# Patient Record
Sex: MALE | Race: ASIAN | NOT HISPANIC OR LATINO
[De-identification: names, ages, dates, MRNs, and addresses within clinical notes are randomized per-mention and may not be internally consistent; named-entity substitution may affect disease eponyms.]

---

## 2021-06-07 PROBLEM — Z00.00 ENCOUNTER FOR PREVENTIVE HEALTH EXAMINATION: Status: ACTIVE | Noted: 2021-06-07

## 2021-06-08 ENCOUNTER — APPOINTMENT (OUTPATIENT)
Dept: CARDIOLOGY | Facility: CLINIC | Age: 66
End: 2021-06-08
Payer: MEDICAID

## 2021-06-08 VITALS
SYSTOLIC BLOOD PRESSURE: 163 MMHG | TEMPERATURE: 97.3 F | RESPIRATION RATE: 17 BRPM | WEIGHT: 185 LBS | OXYGEN SATURATION: 97 % | DIASTOLIC BLOOD PRESSURE: 107 MMHG | BODY MASS INDEX: 29.03 KG/M2 | HEART RATE: 78 BPM | HEIGHT: 67 IN

## 2021-06-08 PROCEDURE — 93306 TTE W/DOPPLER COMPLETE: CPT

## 2021-06-08 PROCEDURE — 99204 OFFICE O/P NEW MOD 45 MIN: CPT | Mod: 25

## 2021-06-08 PROCEDURE — 93015 CV STRESS TEST SUPVJ I&R: CPT

## 2021-06-16 ENCOUNTER — NON-APPOINTMENT (OUTPATIENT)
Age: 66
End: 2021-06-16

## 2021-12-21 ENCOUNTER — APPOINTMENT (OUTPATIENT)
Dept: CARDIOLOGY | Facility: CLINIC | Age: 66
End: 2021-12-21
Payer: MEDICAID

## 2021-12-21 VITALS
OXYGEN SATURATION: 96 % | RESPIRATION RATE: 18 BRPM | SYSTOLIC BLOOD PRESSURE: 145 MMHG | BODY MASS INDEX: 28.98 KG/M2 | WEIGHT: 185 LBS | TEMPERATURE: 98 F | DIASTOLIC BLOOD PRESSURE: 77 MMHG | HEART RATE: 82 BPM

## 2021-12-21 PROCEDURE — 99214 OFFICE O/P EST MOD 30 MIN: CPT

## 2021-12-21 NOTE — HISTORY OF PRESENT ILLNESS
[FreeTextEntry1] : 66 year-old male with HTN, DM, and HLD presents for evaluation of CP and palpitations.  Patient reports that for the past few months he has been experiencing left upper and lower CP, described as tightness and also palpitations, not related to exertion, lasting hours.  Patient denies SOB.  Patient denies h/o syncope.  I advised patient to undergo an echocardiogram and a treadmill stress test.  I advised patient to wear a Holter monitor.  Patient underwent an echocardiogram and it showed normal LV function without significant valvular pathology. Patient underwent a treadmill stress test and completed 6 minutes of Stephen protocol.  There were upsloping ST depressions on ECG but no symptoms.  Following treadmill stress, there was echocardiographic evidence of basal inferior ischemia.  Patient wore a Holter and it showed average HR 63, rare PVCs, 9.44% PACs and couplets. 3 runs of PATs, the longest and fastest 19 beats at a rate of 137.  I advised patient to continue ASA 81 mg and Atorvastatin 40 mg.  I advised patient to start Metoprolol ER 25 mg.

## 2021-12-26 RX ORDER — ATORVASTATIN CALCIUM 40 MG/1
40 TABLET, FILM COATED ORAL
Qty: 30 | Refills: 0 | Status: ACTIVE | COMMUNITY
Start: 2021-12-14

## 2021-12-26 RX ORDER — METFORMIN HYDROCHLORIDE 1000 MG/1
1000 TABLET, COATED ORAL
Qty: 60 | Refills: 0 | Status: ACTIVE | COMMUNITY
Start: 2021-12-14

## 2021-12-26 RX ORDER — ERGOCALCIFEROL 1.25 MG/1
1.25 MG CAPSULE, LIQUID FILLED ORAL
Qty: 4 | Refills: 0 | Status: ACTIVE | COMMUNITY
Start: 2021-12-14

## 2021-12-26 RX ORDER — ALOGLIPTIN 25 MG/1
25 TABLET, FILM COATED ORAL
Qty: 30 | Refills: 0 | Status: ACTIVE | COMMUNITY
Start: 2021-12-14

## 2021-12-26 RX ORDER — ERTUGLIFLOZIN 15 MG/1
15 TABLET, FILM COATED ORAL
Qty: 30 | Refills: 0 | Status: ACTIVE | COMMUNITY
Start: 2021-12-14

## 2021-12-26 RX ORDER — CHLORHEXIDINE GLUCONATE 4 %
1000 LIQUID (ML) TOPICAL
Qty: 30 | Refills: 0 | Status: ACTIVE | COMMUNITY
Start: 2021-12-14

## 2021-12-26 NOTE — REASON FOR VISIT
[Symptom and Test Evaluation] : symptom and test evaluation [FreeTextEntry1] : 66 year-old male with HTN, DM, and HLD presents for followup.  \par \par Patient was last seen on 6/8/21 for evaluation of CP and palpitations.  Patient underwent an echocardiogram and it showed normal LV function without significant valvular pathology. Patient underwent a treadmill stress test and completed 5.5 minutes of Stephen protocol.  There were upsloping ST depressions on ECG but no symptoms.  Following treadmill stress, there was echocardiographic evidence of basal inferior ischemia.  Patient wore a Holter and it showed average HR 63, rare PVCs, 9.44% PACs and couplets. 3 runs of PATs, the longest and fastest 19 beats at a rate of 137.  I advised patient to continue ASA 81 mg and Atorvastatin 40 mg.  I advised patient to start Metoprolol ER 25 mg.

## 2021-12-26 NOTE — HISTORY OF PRESENT ILLNESS
[FreeTextEntry1] : 12/21/21 - \par \par 6/8/21 - Patient reports that for the past few months he has been experiencing left upper and lower CP, described as tightness and also palpitations, not related to exertion, lasting hours.  Patient denies SOB.  Patient denies h/o syncope.  I advised patient to undergo an echocardiogram and a treadmill stress test.  I advised patient to wear a Holter monitor.  Patient underwent an echocardiogram and it showed normal LV function without significant valvular pathology. Patient underwent a treadmill stress test and completed 6 minutes of Stephen protocol.  There were upsloping ST depressions on ECG but no symptoms.  Following treadmill stress, there was echocardiographic evidence of basal inferior ischemia.  Patient wore a Holter and it showed average HR 63, rare PVCs, 9.44% PACs and couplets. 3 runs of PATs, the longest and fastest 19 beats at a rate of 137.  I advised patient to continue ASA 81 mg and Atorvastatin 40 mg.  I advised patient to start Metoprolol ER 25 mg.

## 2023-12-19 ENCOUNTER — APPOINTMENT (OUTPATIENT)
Dept: CARDIOLOGY | Facility: CLINIC | Age: 68
End: 2023-12-19
Payer: MEDICARE

## 2023-12-19 VITALS
TEMPERATURE: 97.6 F | BODY MASS INDEX: 27.88 KG/M2 | WEIGHT: 178 LBS | OXYGEN SATURATION: 97 % | SYSTOLIC BLOOD PRESSURE: 192 MMHG | DIASTOLIC BLOOD PRESSURE: 83 MMHG | RESPIRATION RATE: 16 BRPM | HEART RATE: 81 BPM

## 2023-12-19 DIAGNOSIS — R94.39 ABNORMAL RESULT OF OTHER CARDIOVASCULAR FUNCTION STUDY: ICD-10-CM

## 2023-12-19 PROCEDURE — 93000 ELECTROCARDIOGRAM COMPLETE: CPT

## 2023-12-19 PROCEDURE — 99214 OFFICE O/P EST MOD 30 MIN: CPT | Mod: 25

## 2023-12-19 PROCEDURE — 93306 TTE W/DOPPLER COMPLETE: CPT

## 2023-12-20 PROBLEM — R94.39 ABNORMAL CARDIOVASCULAR STRESS TEST: Status: ACTIVE | Noted: 2021-12-26

## 2023-12-20 LAB
ALBUMIN SERPL ELPH-MCNC: 4.6 G/DL
ALP BLD-CCNC: 84 U/L
ALT SERPL-CCNC: 14 U/L
ANION GAP SERPL CALC-SCNC: 16 MMOL/L
AST SERPL-CCNC: 16 U/L
BILIRUB SERPL-MCNC: 0.9 MG/DL
BUN SERPL-MCNC: 14 MG/DL
CALCIUM SERPL-MCNC: 9 MG/DL
CHLORIDE SERPL-SCNC: 105 MMOL/L
CO2 SERPL-SCNC: 25 MMOL/L
CREAT SERPL-MCNC: 0.51 MG/DL
EGFR: 112 ML/MIN/1.73M2
GLUCOSE SERPL-MCNC: 145 MG/DL
NT-PROBNP SERPL-MCNC: 171 PG/ML
POTASSIUM SERPL-SCNC: 3.3 MMOL/L
PROT SERPL-MCNC: 6.6 G/DL
SODIUM SERPL-SCNC: 145 MMOL/L
TROPONIN I SERPL-MCNC: 0.01 NG/ML

## 2023-12-20 NOTE — REASON FOR VISIT
[FreeTextEntry1] : 66 year-old male with HTN, DM, and HLD presents for followup.    Patient was last seen on 12/21/21 - Patient reports left upper CP, worse with exertion, non-tender to touch, lasting 1 hour.  Patient reports mild SOB.  I advised patient to resume ASA 81 mg and start NTPatch.  FU 3 months.   Patient underwent an echocardiogram 6/8/21 and it showed normal LV function without significant valvular pathology.   Patient underwent a treadmill stress test 6/8/21 and completed 5.5 minutes of Stephen protocol.  There were upsloping ST depressions on ECG but no symptoms.  Following treadmill stress, there was echocardiographic evidence of basal inferior ischemia.    Patient wore a Holter and it showed average HR 63, rare PVCs, 9.44% PACs and couplets. 3 runs of PATs, the longest and fastest 19 beats at a rate of 137.  I advised patient to continue ASA 81 mg and Atorvastatin 40 mg.  I advised patient to start Metoprolol ER 25 mg.

## 2023-12-20 NOTE — HISTORY OF PRESENT ILLNESS
[FreeTextEntry1] : 12/19/23 - Patient reports SSCP and palpitations for 2 weeks, not related to exertion.  Patient reports baseline SHEPPARD.  Patient was noted to have an abnormal ECG by PCP, showing RBBB with nonspecific STTw changes.  /83 HR 81.  I advised patient to start Metoprolol ER 25 mg QD.  I advised patient to start Amlodipine 5 mg QD.  FU 2 weeks.  I will check Troponin level.  I will consider treadmill stress test when BP is better vs. CTA of the coronary arteries.  12/21/21 - Patient reports left upper CP, worse with exertion, non-tender to touch, lasting 1 hour.  Patient reports mild SOB.  I advised patient to resume ASA 81 mg and start NTPatch.  FU 3 months.   6/8/21 - Patient reports that for the past few months he has been experiencing left upper and lower CP, described as tightness and also palpitations, not related to exertion, lasting hours.  Patient denies SOB.  Patient denies h/o syncope.  I advised patient to undergo an echocardiogram and a treadmill stress test.  I advised patient to wear a Holter monitor.  Patient underwent an echocardiogram and it showed normal LV function without significant valvular pathology. Patient underwent a treadmill stress test and completed 6 minutes of Stephen protocol.  There were upsloping ST depressions on ECG but no symptoms.  Following treadmill stress, there was echocardiographic evidence of basal inferior ischemia.  Patient wore a Holter and it showed average HR 63, rare PVCs, 9.44% PACs and couplets. 3 runs of PATs, the longest and fastest 19 beats at a rate of 137.  I advised patient to continue ASA 81 mg and Atorvastatin 40 mg.  I advised patient to start Metoprolol ER 25 mg.

## 2023-12-30 PROBLEM — R07.89 CHEST DISCOMFORT: Status: ACTIVE | Noted: 2021-06-08

## 2023-12-30 NOTE — PHYSICAL EXAM
[Well Developed] : well developed [Well Nourished] : well nourished [No Acute Distress] : no acute distress [Normal Conjunctiva] : normal conjunctiva [Normal Venous Pressure] : normal venous pressure [No Carotid Bruit] : no carotid bruit [Normal S1, S2] : normal S1, S2 [No Murmur] : no murmur [No Rub] : no rub [No Gallop] : no gallop [Clear Lung Fields] : clear lung fields [Good Air Entry] : good air entry [No Respiratory Distress] : no respiratory distress  [Soft] : abdomen soft [Non Tender] : non-tender [No Masses/organomegaly] : no masses/organomegaly [Normal Gait] : normal gait [Normal Bowel Sounds] : normal bowel sounds [No Edema] : no edema [No Cyanosis] : no cyanosis [No Clubbing] : no clubbing [No Varicosities] : no varicosities [No Focal Deficits] : no focal deficits [Moves all extremities] : moves all extremities [Normal Speech] : normal speech [Alert and Oriented] : alert and oriented [Normal memory] : normal memory

## 2024-01-02 ENCOUNTER — APPOINTMENT (OUTPATIENT)
Dept: CARDIOLOGY | Facility: CLINIC | Age: 69
End: 2024-01-02
Payer: MEDICARE

## 2024-01-02 VITALS
BODY MASS INDEX: 26.94 KG/M2 | WEIGHT: 172 LBS | OXYGEN SATURATION: 98 % | HEART RATE: 91 BPM | RESPIRATION RATE: 16 BRPM | SYSTOLIC BLOOD PRESSURE: 186 MMHG | TEMPERATURE: 97.7 F | DIASTOLIC BLOOD PRESSURE: 80 MMHG

## 2024-01-02 DIAGNOSIS — R07.89 OTHER CHEST PAIN: ICD-10-CM

## 2024-01-02 DIAGNOSIS — R00.2 PALPITATIONS: ICD-10-CM

## 2024-01-02 PROCEDURE — 99213 OFFICE O/P EST LOW 20 MIN: CPT

## 2024-01-02 RX ORDER — METOPROLOL SUCCINATE 50 MG/1
50 TABLET, EXTENDED RELEASE ORAL DAILY
Qty: 30 | Refills: 5 | Status: ACTIVE | COMMUNITY
Start: 2021-06-08 | End: 1900-01-01

## 2024-01-02 RX ORDER — AMLODIPINE BESYLATE 5 MG/1
5 TABLET ORAL DAILY
Qty: 30 | Refills: 5 | Status: ACTIVE | COMMUNITY
Start: 2021-12-14 | End: 1900-01-01

## 2024-01-02 NOTE — HISTORY OF PRESENT ILLNESS
[FreeTextEntry1] : 1/2/24 - Patient reports home SBP around 160.  Patient reports CP.  Patient denies SOB.  Patient reports palpitations.  His BP was elevated.  I advised patient to increase Metoprolol ER to 50 mg.  I advised patient to increase Amlodipine to 5 mg.  I advised patient to wear a 7-day event monitor.  I advised patient to undergo a CTA of coronary arteries.  I will obtain insurance authorization.   12/19/23 - Patient reports SSCP and palpitations for 2 weeks, not related to exertion.  Patient reports baseline SHEPPARD.  Patient was noted to have an abnormal ECG by PCP, showing RBBB with nonspecific STTw changes.  /83 HR 81.  I advised patient to start Metoprolol ER 25 mg QD.  I advised patient to start Amlodipine 2.5 mg QD.  FU 2 weeks.  I will check Troponin level.  I will consider treadmill stress test when BP is better vs. CTA of the coronary arteries.  12/21/21 - Patient reports left upper CP, worse with exertion, non-tender to touch, lasting 1 hour.  Patient reports mild SOB.  I advised patient to resume ASA 81 mg and start NTPatch.  FU 3 months.   6/8/21 - Patient reports that for the past few months he has been experiencing left upper and lower CP, described as tightness and also palpitations, not related to exertion, lasting hours.  Patient denies SOB.  Patient denies h/o syncope.  I advised patient to undergo an echocardiogram and a treadmill stress test.  I advised patient to wear a Holter monitor.  Patient underwent an echocardiogram and it showed normal LV function without significant valvular pathology. Patient underwent a treadmill stress test and completed 6 minutes of Stephen protocol.  There were upsloping ST depressions on ECG but no symptoms.  Following treadmill stress, there was echocardiographic evidence of basal inferior ischemia.  Patient wore a Holter and it showed average HR 63, rare PVCs, 9.44% PACs and couplets. 3 runs of PATs, the longest and fastest 19 beats at a rate of 137.  I advised patient to continue ASA 81 mg and Atorvastatin 40 mg.  I advised patient to start Metoprolol ER 25 mg.

## 2024-01-02 NOTE — REASON FOR VISIT
[FreeTextEntry1] : 68 year-old male with HTN, DM, HLD, presents for followup.    Patient was last seen on 12/19/23 - Patient reports SSCP and palpitations for 2 weeks, not related to exertion.  Patient reports baseline SHEPPARD.  Patient was noted to have an abnormal ECG by PCP, showing RBBB with nonspecific STTw changes.  /83 HR 81.  I advised patient to start Metoprolol ER 25 mg QD.  I advised patient to start Amlodipine 2.5 mg QD.  FU 2 weeks.  I will check Troponin level.  I will consider treadmill stress test when BP is better vs. CTA of the coronary arteries.  He is on Metoprolol ER 25 mg, Amlodipine 2.5 mg, Valsartan 160 mg, for HTN.  He is on Atorvastatin 40 mg for HLD.  He is on Doxazosin 1 mg for BPH.  Patient underwent an echocardiogram 6/8/21 and it showed normal LV function without significant valvular pathology.   Patient underwent a treadmill stress test 6/8/21 and completed 5.5 minutes of Stephen protocol.  There were upsloping ST depressions on ECG but no symptoms.  Following treadmill stress, there was echocardiographic evidence of basal inferior ischemia.    Patient wore a Holter and it showed average HR 63, rare PVCs, 9.44% PACs and couplets. 3 runs of PATs, the longest and fastest 19 beats at a rate of 137.  I advised patient to continue ASA 81 mg and Atorvastatin 40 mg.  I advised patient to start Metoprolol ER 25 mg.

## 2024-01-15 ENCOUNTER — NON-APPOINTMENT (OUTPATIENT)
Age: 69
End: 2024-01-15

## 2024-01-16 ENCOUNTER — APPOINTMENT (OUTPATIENT)
Dept: UROLOGY | Facility: CLINIC | Age: 69
End: 2024-01-16
Payer: MEDICARE

## 2024-01-16 VITALS
RESPIRATION RATE: 16 BRPM | HEIGHT: 73 IN | HEART RATE: 85 BPM | DIASTOLIC BLOOD PRESSURE: 79 MMHG | SYSTOLIC BLOOD PRESSURE: 204 MMHG | OXYGEN SATURATION: 96 % | TEMPERATURE: 97.1 F | BODY MASS INDEX: 23.19 KG/M2 | WEIGHT: 175 LBS

## 2024-01-16 VITALS — DIASTOLIC BLOOD PRESSURE: 102 MMHG | SYSTOLIC BLOOD PRESSURE: 185 MMHG

## 2024-01-16 DIAGNOSIS — N13.8 BENIGN PROSTATIC HYPERPLASIA WITH LOWER URINARY TRACT SYMPMS: ICD-10-CM

## 2024-01-16 DIAGNOSIS — F17.210 NICOTINE DEPENDENCE, CIGARETTES, UNCOMPLICATED: ICD-10-CM

## 2024-01-16 DIAGNOSIS — E11.628 TYPE 2 DIABETES MELLITUS WITH OTHER SKIN COMPLICATIONS: ICD-10-CM

## 2024-01-16 DIAGNOSIS — R35.0 FREQUENCY OF MICTURITION: ICD-10-CM

## 2024-01-16 DIAGNOSIS — R35.1 NOCTURIA: ICD-10-CM

## 2024-01-16 DIAGNOSIS — Z78.9 OTHER SPECIFIED HEALTH STATUS: ICD-10-CM

## 2024-01-16 DIAGNOSIS — N40.1 BENIGN PROSTATIC HYPERPLASIA WITH LOWER URINARY TRACT SYMPMS: ICD-10-CM

## 2024-01-16 PROCEDURE — G2211 COMPLEX E/M VISIT ADD ON: CPT

## 2024-01-16 PROCEDURE — 99203 OFFICE O/P NEW LOW 30 MIN: CPT

## 2024-01-16 RX ORDER — TAMSULOSIN HYDROCHLORIDE 0.4 MG/1
0.4 CAPSULE ORAL
Qty: 30 | Refills: 3 | Status: ACTIVE | COMMUNITY
Start: 2024-01-16 | End: 1900-01-01

## 2024-01-17 LAB
ANION GAP SERPL CALC-SCNC: 12 MMOL/L
APPEARANCE: CLEAR
BACTERIA: NEGATIVE /HPF
BILIRUBIN URINE: NEGATIVE
BLOOD URINE: NEGATIVE
BUN SERPL-MCNC: 14 MG/DL
CALCIUM SERPL-MCNC: 9.3 MG/DL
CAST: 0 /LPF
CHLORIDE SERPL-SCNC: 106 MMOL/L
CO2 SERPL-SCNC: 24 MMOL/L
COLOR: YELLOW
CREAT SERPL-MCNC: 0.62 MG/DL
EGFR: 105 ML/MIN/1.73M2
EPITHELIAL CELLS: 0 /HPF
ESTIMATED AVERAGE GLUCOSE: 146 MG/DL
GLUCOSE QUALITATIVE U: >=1000 MG/DL
GLUCOSE SERPL-MCNC: 199 MG/DL
HBA1C MFR BLD HPLC: 6.7 %
KETONES URINE: NEGATIVE MG/DL
LEUKOCYTE ESTERASE URINE: NEGATIVE
MICROSCOPIC-UA: NORMAL
NITRITE URINE: NEGATIVE
PH URINE: 7.5
POTASSIUM SERPL-SCNC: 3.9 MMOL/L
PROTEIN URINE: NEGATIVE MG/DL
PSA FREE FLD-MCNC: 28 %
PSA FREE SERPL-MCNC: 0.16 NG/ML
PSA SERPL-MCNC: 0.57 NG/ML
RED BLOOD CELLS URINE: 0 /HPF
SODIUM SERPL-SCNC: 142 MMOL/L
SPECIFIC GRAVITY URINE: 1.03
UROBILINOGEN URINE: 1 MG/DL
WHITE BLOOD CELLS URINE: 0 /HPF

## 2024-01-20 NOTE — LETTER BODY
[FreeTextEntry1] : Chelo Mcdonald MD [id number],  [place], [place], [address] [phone]  Dear Dr. Mcdonald,  Reason for Visit: BPH.  This is a 68 year old gentleman with symptoms of BPH. Patient is here today for evaluation. Patient reports he has weak uroflow, frequency, and hesitancy. He denies any hematuria or urinary incontinence. His symptoms are aggravated by hydration. He denies any alleviating factors. He has not tried any medical therapy previously. He reports no pain. Patient is diabetic. All other review of systems are negative. Past medical history, family history, and social history were inquired and were noncontributory to patient current condition. Medications and allergies were reviewed.  On examination, the patient is a healthy-appearing gentleman in no acute distress. He is alert and oriented follows commands. He has normal mood and affect. He is normocephalic. Neck is supple. Oral no thrush Respirations are unlabored. His abdomen is soft and nontender. Bladder is nonpalpable. No CVA tenderness. Neurologically he is grossly intact. No peripheral edema. Skin without gross abnormality. He has normal male external genitalia. Normal meatus. Bilateral testes are descended intrascrotally and normal to palpation. On rectal examination, there is normal sphincter tone. The prostate is clinically benign without focal induration or nodularity.   ASSESSMENT: BPH  I counseled the patient on the various etiology of his symptoms. I discussed the natural history of BPH and the treatment options available. I discussed the options of conservative management with fluid in dietary restrictions, herbal therapy, medical therapy, and minimally invasive procedures.  Risk and benefits were discussed. I answered his questions. I recommended he try Flomax. I discussed the potential side effects of the medication. I counseled the patient on its use and side effects. If the patient develops any side effects, the patient will discontinue the medication and contact me. He will obtain urinalysis, A1C, PSA and BMP to establish baseline. Risks and alternatives were discussed. I answered the patient questions. The patient will follow-up as directed and will contact me with any questions or concerns.   Plan: Trial of Flomax. Followup 1 month. PSA. BMP. A1C. Urinalysis.

## 2024-01-20 NOTE — ADDENDUM
[FreeTextEntry1] : Entered by Maddy Raymond acting as scribe for Dr. Reed Muller.  The documentation recorded by the scribe accurately reflects the service I personally performed and the decisions made by me.

## 2024-02-09 ENCOUNTER — APPOINTMENT (OUTPATIENT)
Dept: CARDIOLOGY | Facility: CLINIC | Age: 69
End: 2024-02-09
Payer: MEDICARE

## 2024-02-09 VITALS
RESPIRATION RATE: 18 BRPM | HEART RATE: 73 BPM | BODY MASS INDEX: 23.09 KG/M2 | WEIGHT: 175 LBS | SYSTOLIC BLOOD PRESSURE: 156 MMHG | TEMPERATURE: 97.3 F | DIASTOLIC BLOOD PRESSURE: 71 MMHG | OXYGEN SATURATION: 98 %

## 2024-02-09 PROCEDURE — 99213 OFFICE O/P EST LOW 20 MIN: CPT

## 2024-02-15 RX ORDER — ASPIRIN ENTERIC COATED TABLETS 81 MG 81 MG/1
81 TABLET, DELAYED RELEASE ORAL DAILY
Qty: 30 | Refills: 5 | Status: DISCONTINUED | COMMUNITY
Start: 2021-12-21 | End: 2024-02-15

## 2024-02-15 RX ORDER — NITROGLYCERIN 20 MG/1
0.1 PATCH TRANSDERMAL DAILY
Qty: 30 | Refills: 5 | Status: ACTIVE | COMMUNITY
Start: 2021-12-21 | End: 1900-01-01

## 2024-02-15 NOTE — REASON FOR VISIT
[FreeTextEntry1] : 68 year-old male with CAD by CTA, elevated calcium score 1902, HTN, DM, HLD, presents for followup.    Patient was last seen on 1/2/24 - Patient reports home SBP around 160.  Patient reports CP.  Patient denies SOB.  Patient reports palpitations.  His BP was elevated.  I advised patient to increase Metoprolol ER to 50 mg.  I advised patient to increase Amlodipine to 5 mg.  I advised patient to wear a 7-day event monitor.  I advised patient to undergo a CTA of coronary arteries.  I will obtain insurance authorization.  Patient wore a 7-day event monitor and it showed average HR of 71, frequent APC's (30%), rare PVC's, without significant arrhythmia. No AFIB.  CTA of the coronary arteries showed 25-40% LM, 70% LAD, 50-69% OM1, 70% OM1, 99% RCA, with calcium score 1902. EF 33%.   He is on Metoprolol ER 25 mg, Amlodipine 2.5 mg, Valsartan 160 mg, for HTN.  He is on Atorvastatin 40 mg for HLD.  He is on Doxazosin 1 mg for BPH.  Patient underwent an echocardiogram 6/8/21 and it showed normal LV function without significant valvular pathology.   Patient underwent a treadmill stress test 6/8/21 and completed 5.5 minutes of Stephen protocol.  There were upsloping ST depressions on ECG but no symptoms.  Following treadmill stress, there was echocardiographic evidence of basal inferior ischemia.    Patient wore a Holter and it showed average HR 63, rare PVCs, 9.44% PACs and couplets. 3 runs of PATs, the longest and fastest 19 beats at a rate of 137.  I advised patient to continue ASA 81 mg and Atorvastatin 40 mg.  I advised patient to start Metoprolol ER 25 mg.

## 2024-02-15 NOTE — HISTORY OF PRESENT ILLNESS
[FreeTextEntry1] :  2/9/24 - CTA of the coronary arteries showed 25-40% LM, 70% LAD, 50-69% OM1, 70% OM1, 99% RCA, with calcium score 1902. EF 33%.  I advised patient to continue statin therapy.  I advised patient to resume ASA 81 mg.  I advised patient to start NTPatch.  I advised patient to consider cardiac cath.  FU 1 month.   1/2/24 - Patient reports home SBP around 160.  Patient reports CP.  Patient denies SOB.  Patient reports palpitations.  His BP was elevated.  I advised patient to increase Metoprolol ER to 50 mg.  I advised patient to increase Amlodipine to 5 mg.  I advised patient to wear a 7-day event monitor.  I advised patient to undergo a CTA of coronary arteries.  I will obtain insurance authorization.  Patient wore a 7-day event monitor and it showed average HR of 71, frequent APC's (30%), rare PVC's, without significant arrhythmia. No AFIB.  CTA of the coronary arteries showed 25-40% LM, 70% LAD, 50-69% OM1, 70% OM1, 99% RCA, with calcium score 1902. EF 33%.   12/19/23 - Patient reports SSCP and palpitations for 2 weeks, not related to exertion.  Patient reports baseline SHEPPARD.  Patient was noted to have an abnormal ECG by PCP, showing RBBB with nonspecific STTw changes.  /83 HR 81.  I advised patient to start Metoprolol ER 25 mg QD.  I advised patient to start Amlodipine 2.5 mg QD.  FU 2 weeks.  I will check Troponin level.  I will consider treadmill stress test when BP is better vs. CTA of the coronary arteries.  12/21/21 - Patient reports left upper CP, worse with exertion, non-tender to touch, lasting 1 hour.  Patient reports mild SOB.  I advised patient to resume ASA 81 mg and start NTPatch.  FU 3 months.   6/8/21 - Patient reports that for the past few months he has been experiencing left upper and lower CP, described as tightness and also palpitations, not related to exertion, lasting hours.  Patient denies SOB.  Patient denies h/o syncope.  I advised patient to undergo an echocardiogram and a treadmill stress test.  I advised patient to wear a Holter monitor.  Patient underwent an echocardiogram and it showed normal LV function without significant valvular pathology. Patient underwent a treadmill stress test and completed 6 minutes of Stephen protocol.  There were upsloping ST depressions on ECG but no symptoms.  Following treadmill stress, there was echocardiographic evidence of basal inferior ischemia.  Patient wore a Holter and it showed average HR 63, rare PVCs, 9.44% PACs and couplets. 3 runs of PATs, the longest and fastest 19 beats at a rate of 137.  I advised patient to continue ASA 81 mg and Atorvastatin 40 mg.  I advised patient to start Metoprolol ER 25 mg.

## 2024-02-20 ENCOUNTER — APPOINTMENT (OUTPATIENT)
Dept: UROLOGY | Facility: CLINIC | Age: 69
End: 2024-02-20

## 2024-03-10 PROBLEM — R93.1 ABNORMAL CARDIAC CT ANGIOGRAPHY: Status: ACTIVE | Noted: 2024-02-09

## 2024-03-10 NOTE — PHYSICAL EXAM
[Well Developed] : well developed [Well Nourished] : well nourished [No Acute Distress] : no acute distress [Normal Conjunctiva] : normal conjunctiva [No Carotid Bruit] : no carotid bruit [Normal Venous Pressure] : normal venous pressure [Normal S1, S2] : normal S1, S2 [No Murmur] : no murmur [No Rub] : no rub [No Gallop] : no gallop [Clear Lung Fields] : clear lung fields [Good Air Entry] : good air entry [No Respiratory Distress] : no respiratory distress  [Soft] : abdomen soft [No Masses/organomegaly] : no masses/organomegaly [Non Tender] : non-tender [Normal Gait] : normal gait [Normal Bowel Sounds] : normal bowel sounds [No Edema] : no edema [No Cyanosis] : no cyanosis [No Clubbing] : no clubbing [No Varicosities] : no varicosities [Moves all extremities] : moves all extremities [No Focal Deficits] : no focal deficits [Normal Speech] : normal speech [Alert and Oriented] : alert and oriented [Normal memory] : normal memory

## 2024-03-12 ENCOUNTER — APPOINTMENT (OUTPATIENT)
Dept: CARDIOLOGY | Facility: CLINIC | Age: 69
End: 2024-03-12
Payer: MEDICARE

## 2024-03-12 VITALS
DIASTOLIC BLOOD PRESSURE: 81 MMHG | RESPIRATION RATE: 18 BRPM | HEART RATE: 71 BPM | OXYGEN SATURATION: 97 % | TEMPERATURE: 97.1 F | SYSTOLIC BLOOD PRESSURE: 183 MMHG | BODY MASS INDEX: 22.96 KG/M2 | WEIGHT: 174 LBS

## 2024-03-12 DIAGNOSIS — R93.1 ABNORMAL FINDINGS ON DIAGNOSTIC IMAGING OF HEART AND CORONARY CIRCULATION: ICD-10-CM

## 2024-03-12 PROCEDURE — 99213 OFFICE O/P EST LOW 20 MIN: CPT

## 2024-03-12 RX ORDER — NICOTINE POLACRILEX 2 MG/1
2 GUM, CHEWING ORAL
Qty: 1 | Refills: 5 | Status: ACTIVE | COMMUNITY
Start: 2024-03-12 | End: 1900-01-01

## 2024-03-12 RX ORDER — VALSARTAN 320 MG/1
320 TABLET, COATED ORAL
Qty: 30 | Refills: 5 | Status: ACTIVE | COMMUNITY
Start: 2021-12-14 | End: 1900-01-01

## 2024-03-16 NOTE — HISTORY OF PRESENT ILLNESS
[FreeTextEntry1] : 3/12/24 - Pt reports having flu for 2 weeks and his SBP increased to 180 for 2 days. Pt reports CP and palpitation are improved. Pt also reports positional dizziness occasionally for 2 weeks. Patient denies SOB. Patient denies h/o syncope. Pt denies any bleeding issue while taking ASA. Pt admits smoking 1/3 PPD and drinking coffee 1 cup/day. Today's /73 P 71, repeated /90P 71. Patient has trace edema on L   Pt is on Metoprolol ER 25 mg, Amlodipine 2.5 mg, Valsartan 160 mg, ASA 81 mg, and NTPatch QD. He is on Atorvastatin 40 mg for HLD. He is on Doxazosin 1 mg for BPH.  2/9/24 - CTA of the coronary arteries showed 25-40% LM, 70% LAD, 50-69% OM1, 70% OM1, 99% RCA, with calcium score 1902. EF 33%.  I advised patient to continue statin therapy.  I advised patient to resume ASA 81 mg.  I advised patient to start NTPatch.  I advised patient to consider cardiac cath.  FU 1 month.   1/2/24 - Patient reports home SBP around 160.  Patient reports CP.  Patient denies SOB.  Patient reports palpitations.  His BP was elevated.  I advised patient to increase Metoprolol ER to 50 mg.  I advised patient to increase Amlodipine to 5 mg.  I advised patient to wear a 7-day event monitor.  I advised patient to undergo a CTA of coronary arteries.  I will obtain insurance authorization.  Patient wore a 7-day event monitor and it showed average HR of 71, frequent APC's (30%), rare PVC's, without significant arrhythmia. No AFIB.  CTA of the coronary arteries showed 25-40% LM, 70% LAD, 50-69% OM1, 70% OM1, 99% RCA, with calcium score 1902. EF 33%.   12/19/23 - Patient reports SSCP and palpitations for 2 weeks, not related to exertion.  Patient reports baseline SHEPPARD.  Patient was noted to have an abnormal ECG by PCP, showing RBBB with nonspecific STTw changes.  /83 HR 81.  I advised patient to start Metoprolol ER 25 mg QD.  I advised patient to start Amlodipine 2.5 mg QD.  FU 2 weeks.  I will check Troponin level.  I will consider treadmill stress test when BP is better vs. CTA of the coronary arteries.  12/21/21 - Patient reports left upper CP, worse with exertion, non-tender to touch, lasting 1 hour.  Patient reports mild SOB.  I advised patient to resume ASA 81 mg and start NTPatch.  FU 3 months.   6/8/21 - Patient reports that for the past few months he has been experiencing left upper and lower CP, described as tightness and also palpitations, not related to exertion, lasting hours.  Patient denies SOB.  Patient denies h/o syncope.  I advised patient to undergo an echocardiogram and a treadmill stress test.  I advised patient to wear a Holter monitor.  Patient underwent an echocardiogram and it showed normal LV function without significant valvular pathology. Patient underwent a treadmill stress test and completed 6 minutes of Stephen protocol.  There were upsloping ST depressions on ECG but no symptoms.  Following treadmill stress, there was echocardiographic evidence of basal inferior ischemia.  Patient wore a Holter and it showed average HR 63, rare PVCs, 9.44% PACs and couplets. 3 runs of PATs, the longest and fastest 19 beats at a rate of 137.  I advised patient to continue ASA 81 mg and Atorvastatin 40 mg.  I advised patient to start Metoprolol ER 25 mg.

## 2024-03-16 NOTE — REASON FOR VISIT
[FreeTextEntry1] : 68 year-old male with CAD by CTA, elevated calcium score 1902, HTN, DM, HLD, presents for followup.    Patient was last seen on 2/9/24 - CTA of the coronary arteries showed 25-40% LM, 70% LAD, 50-69% OM1, 70% OM1, 99% RCA, with calcium score 1902. EF 33%. I advised patient to continue statin therapy. I advised patient to resume ASA 81 mg. I advised patient to start NTPatch. I advised patient to consider cardiac cath. FU 1 month.  Patient wore a 7-day event monitor 1/2/24 and it showed average HR of 71, frequent APC's (30%), rare PVC's, without significant arrhythmia. No AFIB.    He is on Metoprolol ER 25 mg, Amlodipine 2.5 mg, Valsartan 160 mg, for HTN.  He is on Atorvastatin 40 mg for HLD.  He is on Doxazosin 1 mg for BPH.  CTA of the coronary arteries 2/7/24 showed 25-40% LM, 70% LAD, 50-69% OM1, 70% OM1, 99% RCA, with calcium score 1902. EF 33%.   Patient underwent an echocardiogram 6/8/21 and it showed normal LV function without significant valvular pathology.   Patient underwent a treadmill stress test 6/8/21 and completed 5.5 minutes of Stephen protocol.  There were upsloping ST depressions on ECG but no symptoms.  Following treadmill stress, there was echocardiographic evidence of basal inferior ischemia.    Patient wore a Holter and it showed average HR 63, rare PVCs, 9.44% PACs and couplets. 3 runs of PATs, the longest and fastest 19 beats at a rate of 137.  I advised patient to continue ASA 81 mg and Atorvastatin 40 mg.  I advised patient to start Metoprolol ER 25 mg.

## 2024-04-16 ENCOUNTER — APPOINTMENT (OUTPATIENT)
Dept: CARDIOLOGY | Facility: CLINIC | Age: 69
End: 2024-04-16

## 2024-04-22 PROBLEM — I10 HYPERTENSION, UNSPECIFIED TYPE: Status: ACTIVE | Noted: 2023-12-19

## 2024-04-22 PROBLEM — I25.10 CAD (CORONARY ARTERY DISEASE), NATIVE CORONARY ARTERY: Status: ACTIVE | Noted: 2021-12-26

## 2024-04-22 PROBLEM — R93.1 ELEVATED CORONARY ARTERY CALCIUM SCORE: Status: ACTIVE | Noted: 2024-02-09

## 2024-04-23 ENCOUNTER — APPOINTMENT (OUTPATIENT)
Dept: CARDIOLOGY | Facility: CLINIC | Age: 69
End: 2024-04-23
Payer: MEDICARE

## 2024-04-23 VITALS
TEMPERATURE: 96.9 F | RESPIRATION RATE: 16 BRPM | WEIGHT: 174 LBS | OXYGEN SATURATION: 97 % | DIASTOLIC BLOOD PRESSURE: 74 MMHG | BODY MASS INDEX: 22.96 KG/M2 | HEART RATE: 66 BPM | SYSTOLIC BLOOD PRESSURE: 130 MMHG

## 2024-04-23 DIAGNOSIS — R93.1 ABNORMAL FINDINGS ON DIAGNOSTIC IMAGING OF HEART AND CORONARY CIRCULATION: ICD-10-CM

## 2024-04-23 DIAGNOSIS — I25.10 ATHEROSCLEROTIC HEART DISEASE OF NATIVE CORONARY ARTERY W/OUT ANGINA PECTORIS: ICD-10-CM

## 2024-04-23 DIAGNOSIS — I10 ESSENTIAL (PRIMARY) HYPERTENSION: ICD-10-CM

## 2024-04-23 PROCEDURE — 99213 OFFICE O/P EST LOW 20 MIN: CPT

## 2024-04-28 NOTE — HISTORY OF PRESENT ILLNESS
[FreeTextEntry1] : 4/23/24 - Patient has been stable.  Patient denies CP, SOB, palpitations, or lightheadedness.  Patient denies h/o syncope.  /74 HR 66.  Exam unremarkable.  I advised patient to continue current medications.  FU 3 months.   3/12/24 - Pt reports having flu for 2 weeks and his SBP increased to 180 for 2 days. Pt reports CP and palpitation are improved. Pt also reports positional dizziness occasionally for 2 weeks. Patient denies SOB. Patient denies h/o syncope. Pt denies any bleeding issue while taking ASA. Pt admits smoking 1/3 PPD and drinking coffee 1 cup/day. Pt is on Metoprolol ER 25 mg, Amlodipine 2.5 mg, Valsartan 160 mg, ASA 81 mg, and NTPatch QD. He is on Atorvastatin 40 mg for HLD. He is on Doxazosin 1 mg for BPH.  Today's /73 P 71, repeated /90P 71. Patient has trace edema on L.  I advised patient to increase Valsartan to 320 mg.  2/9/24 - CTA of the coronary arteries showed 25-40% LM, 70% LAD, 50-69% OM1, 70% OM1, 99% RCA, with calcium score 1902. EF 33%.  I advised patient to continue statin therapy.  I advised patient to resume ASA 81 mg.  I advised patient to start NTPatch.  I advised patient to consider cardiac cath.  FU 1 month.   1/2/24 - Patient reports home SBP around 160.  Patient reports CP.  Patient denies SOB.  Patient reports palpitations.  His BP was elevated.  I advised patient to increase Metoprolol ER to 50 mg.  I advised patient to increase Amlodipine to 5 mg.  I advised patient to wear a 7-day event monitor.  I advised patient to undergo a CTA of coronary arteries.  I will obtain insurance authorization.  Patient wore a 7-day event monitor and it showed average HR of 71, frequent APC's (30%), rare PVC's, without significant arrhythmia. No AFIB.  CTA of the coronary arteries showed 25-40% LM, 70% LAD, 50-69% OM1, 70% OM1, 99% RCA, with calcium score 1902. EF 33%.   12/19/23 - Patient reports SSCP and palpitations for 2 weeks, not related to exertion.  Patient reports baseline SHEPPARD.  Patient was noted to have an abnormal ECG by PCP, showing RBBB with nonspecific STTw changes.  /83 HR 81.  I advised patient to start Metoprolol ER 25 mg QD.  I advised patient to start Amlodipine 2.5 mg QD.  FU 2 weeks.  I will check Troponin level.  I will consider treadmill stress test when BP is better vs. CTA of the coronary arteries.  Patient underwent an echocardiogram and it showed normal LV function without significant valvular pathology.   12/21/21 - Patient reports left upper CP, worse with exertion, non-tender to touch, lasting 1 hour.  Patient reports mild SOB.  I advised patient to resume ASA 81 mg and start NTPatch.  FU 3 months.   6/8/21 - Patient reports that for the past few months he has been experiencing left upper and lower CP, described as tightness and also palpitations, not related to exertion, lasting hours.  Patient denies SOB.  Patient denies h/o syncope.  I advised patient to undergo an echocardiogram and a treadmill stress test.  I advised patient to wear a Holter monitor.  Patient underwent an echocardiogram and it showed normal LV function without significant valvular pathology. Patient underwent a treadmill stress test and completed 6 minutes of Stephen protocol.  There were upsloping ST depressions on ECG but no symptoms.  Following treadmill stress, there was echocardiographic evidence of basal inferior ischemia.  Patient wore a Holter and it showed average HR 63, rare PVCs, 9.44% PACs and couplets. 3 runs of PATs, the longest and fastest 19 beats at a rate of 137.  I advised patient to continue ASA 81 mg and Atorvastatin 40 mg.  I advised patient to start Metoprolol ER 25 mg.

## 2024-04-28 NOTE — REASON FOR VISIT
[FreeTextEntry1] : 69 year-old male with CAD by CTA, elevated calcium score 1902, HTN, DM, HLD, presents for followup.    Patient was last seen on 3/12/24 - Pt reports having flu for 2 weeks and his SBP increased to 180 for 2 days. Pt reports CP and palpitation are improved. Pt also reports positional dizziness occasionally for 2 weeks. Patient denies SOB. Patient denies h/o syncope. Pt denies any bleeding issue while taking ASA. Pt admits smoking 1/3 PPD and drinking coffee 1 cup/day. Pt is on Metoprolol ER 25 mg, Amlodipine 2.5 mg, Valsartan 160 mg, ASA 81 mg, and NTPatch QD. He is on Atorvastatin 40 mg for HLD. He is on Doxazosin 1 mg for BPH.  Today's /73 P 71, repeated /90P 71. Patient has trace edema on L.  I advised patient to increase Valsartan to 320 mg.  He is on ASA 81 mg for CAD.  He is on Metoprolol ER 25 mg, Amlodipine 2.5 mg, Valsartan 320 mg, for HTN.  He is on Atorvastatin 40 mg for HLD.  He is on Doxazosin 1 mg for BPH.  Patient wore a 7-day event monitor 1/2/24 and it showed average HR of 71, frequent APC's (30%), rare PVC's, without significant arrhythmia. No AFIB.    CTA of the coronary arteries 2/7/24 showed 25-40% LM, 70% LAD, 50-69% OM1, 70% OM1, 99% RCA, with calcium score 1902. EF 33%.   Patient underwent an echocardiogram 12/19/23 and it showed normal LV function without significant valvular pathology.   Patient underwent an echocardiogram 6/8/21 and it showed normal LV function without significant valvular pathology.   Patient underwent a treadmill stress test 6/8/21 and completed 5.5 minutes of Stephen protocol.  There were upsloping ST depressions on ECG but no symptoms.  Following treadmill stress, there was echocardiographic evidence of basal inferior ischemia.    Patient wore a Holter and it showed average HR 63, rare PVCs, 9.44% PACs and couplets. 3 runs of PATs, the longest and fastest 19 beats at a rate of 137.  I advised patient to continue ASA 81 mg and Atorvastatin 40 mg.  I advised patient to start Metoprolol ER 25 mg.

## 2024-07-21 NOTE — REASON FOR VISIT
[FreeTextEntry1] : 69 year-old male with CAD by CTA, elevated calcium score 1902, HTN, DM, HLD, presents for followup.    Patient was last seen on 4/23/24 - Patient has been stable.  Patient denies CP, SOB, palpitations, or lightheadedness.  Patient denies h/o syncope.  /74 HR 66.  Exam unremarkable.  I advised patient to continue current medications.  FU 3 months.   He is on ASA 81 mg for CAD.  He is on Metoprolol ER 25 mg, Amlodipine 2.5 mg, Valsartan 320 mg, for HTN.  He is on Atorvastatin 40 mg for HLD.  He is on Doxazosin 1 mg for BPH.  Patient wore a 7-day event monitor 1/2/24 and it showed average HR of 71, frequent APC's (30%), rare PVC's, without significant arrhythmia. No AFIB.    CTA of the coronary arteries 2/7/24 showed 25-40% LM, 70% LAD, 50-69% OM1, 70% OM1, 99% RCA, with calcium score 1902. EF 33%.   Patient underwent an echocardiogram 12/19/23 and it showed normal LV function without significant valvular pathology.   Patient underwent an echocardiogram 6/8/21 and it showed normal LV function without significant valvular pathology.   Patient underwent a treadmill stress test 6/8/21 and completed 5.5 minutes of Stephen protocol.  There were upsloping ST depressions on ECG but no symptoms.  Following treadmill stress, there was echocardiographic evidence of basal inferior ischemia.    Patient wore a Holter and it showed average HR 63, rare PVCs, 9.44% PACs and couplets. 3 runs of PATs, the longest and fastest 19 beats at a rate of 137.  I advised patient to continue ASA 81 mg and Atorvastatin 40 mg.  I advised patient to start Metoprolol ER 25 mg.

## 2024-07-22 ENCOUNTER — NON-APPOINTMENT (OUTPATIENT)
Age: 69
End: 2024-07-22

## 2024-07-23 ENCOUNTER — APPOINTMENT (OUTPATIENT)
Dept: CARDIOLOGY | Facility: CLINIC | Age: 69
End: 2024-07-23
Payer: MEDICARE

## 2024-07-23 VITALS
SYSTOLIC BLOOD PRESSURE: 147 MMHG | DIASTOLIC BLOOD PRESSURE: 78 MMHG | HEART RATE: 75 BPM | WEIGHT: 166 LBS | RESPIRATION RATE: 16 BRPM | OXYGEN SATURATION: 96 % | BODY MASS INDEX: 21.9 KG/M2

## 2024-07-23 DIAGNOSIS — R93.1 ABNORMAL FINDINGS ON DIAGNOSTIC IMAGING OF HEART AND CORONARY CIRCULATION: ICD-10-CM

## 2024-07-23 DIAGNOSIS — I25.10 ATHEROSCLEROTIC HEART DISEASE OF NATIVE CORONARY ARTERY W/OUT ANGINA PECTORIS: ICD-10-CM

## 2024-07-23 DIAGNOSIS — I10 ESSENTIAL (PRIMARY) HYPERTENSION: ICD-10-CM

## 2024-07-23 PROCEDURE — 99214 OFFICE O/P EST MOD 30 MIN: CPT

## 2024-07-26 NOTE — HISTORY OF PRESENT ILLNESS
[FreeTextEntry1] : 7/23/24- Patient reports stable SOB with exertion. Patient denies CP. Patient denies palpitations. Patient denies h/o syncope. He reports home BP of 140. Patient is using NTPatch. Pt is on Metoprolol ER 50 mg, Amlodipine 5 mg, Valsartan 160 mg, ASA 81 mg, and NTPatch QD. He is on Atorvastatin 40 mg for HLD. He is on Doxazosin 1 mg for BPH.    4/23/24 - Patient has been stable.  Patient denies CP, SOB, palpitations, or lightheadedness.  Patient denies h/o syncope.  /74 HR 66.  Exam unremarkable.  I advised patient to continue current medications.  FU 3 months.   3/12/24 - Pt reports having flu for 2 weeks and his SBP increased to 180 for 2 days. Pt reports CP and palpitation are improved. Pt also reports positional dizziness occasionally for 2 weeks. Patient denies SOB. Patient denies h/o syncope. Pt denies any bleeding issue while taking ASA. Pt admits smoking 1/3 PPD and drinking coffee 1 cup/day. Pt is on Metoprolol ER 25 mg, Amlodipine 2.5 mg, Valsartan 160 mg, ASA 81 mg, and NTPatch QD. He is on Atorvastatin 40 mg for HLD. He is on Doxazosin 1 mg for BPH.  Today's /73 P 71, repeated /90P 71. Patient has trace edema on L.  I advised patient to increase Valsartan to 320 mg.  2/9/24 - CTA of the coronary arteries showed 25-40% LM, 70% LAD, 50-69% OM1, 70% OM1, 99% RCA, with calcium score 1902. EF 33%.  I advised patient to continue statin therapy.  I advised patient to resume ASA 81 mg.  I advised patient to start NTPatch.  I advised patient to consider cardiac cath.  FU 1 month.   1/2/24 - Patient reports home SBP around 160.  Patient reports CP.  Patient denies SOB.  Patient reports palpitations.  His BP was elevated.  I advised patient to increase Metoprolol ER to 50 mg.  I advised patient to increase Amlodipine to 5 mg.  I advised patient to wear a 7-day event monitor.  I advised patient to undergo a CTA of coronary arteries.  I will obtain insurance authorization.  Patient wore a 7-day event monitor and it showed average HR of 71, frequent APC's (30%), rare PVC's, without significant arrhythmia. No AFIB.  CTA of the coronary arteries showed 25-40% LM, 70% LAD, 50-69% OM1, 70% OM1, 99% RCA, with calcium score 1902. EF 33%.   12/19/23 - Patient reports SSCP and palpitations for 2 weeks, not related to exertion.  Patient reports baseline SHEPPARD.  Patient was noted to have an abnormal ECG by PCP, showing RBBB with nonspecific STTw changes.  /83 HR 81.  I advised patient to start Metoprolol ER 25 mg QD.  I advised patient to start Amlodipine 2.5 mg QD.  FU 2 weeks.  I will check Troponin level.  I will consider treadmill stress test when BP is better vs. CTA of the coronary arteries.  Patient underwent an echocardiogram and it showed normal LV function without significant valvular pathology.   12/21/21 - Patient reports left upper CP, worse with exertion, non-tender to touch, lasting 1 hour.  Patient reports mild SOB.  I advised patient to resume ASA 81 mg and start NTPatch.  FU 3 months.   6/8/21 - Patient reports that for the past few months he has been experiencing left upper and lower CP, described as tightness and also palpitations, not related to exertion, lasting hours.  Patient denies SOB.  Patient denies h/o syncope.  I advised patient to undergo an echocardiogram and a treadmill stress test.  I advised patient to wear a Holter monitor.  Patient underwent an echocardiogram and it showed normal LV function without significant valvular pathology. Patient underwent a treadmill stress test and completed 6 minutes of Stephen protocol.  There were upsloping ST depressions on ECG but no symptoms.  Following treadmill stress, there was echocardiographic evidence of basal inferior ischemia.  Patient wore a Holter and it showed average HR 63, rare PVCs, 9.44% PACs and couplets. 3 runs of PATs, the longest and fastest 19 beats at a rate of 137.  I advised patient to continue ASA 81 mg and Atorvastatin 40 mg.  I advised patient to start Metoprolol ER 25 mg.

## 2025-04-11 ENCOUNTER — RX RENEWAL (OUTPATIENT)
Age: 70
End: 2025-04-11

## 2025-04-22 ENCOUNTER — APPOINTMENT (OUTPATIENT)
Dept: CARDIOLOGY | Facility: CLINIC | Age: 70
End: 2025-04-22
Payer: MEDICARE

## 2025-04-22 ENCOUNTER — NON-APPOINTMENT (OUTPATIENT)
Age: 70
End: 2025-04-22

## 2025-04-22 VITALS
WEIGHT: 195 LBS | OXYGEN SATURATION: 96 % | DIASTOLIC BLOOD PRESSURE: 67 MMHG | BODY MASS INDEX: 25.73 KG/M2 | RESPIRATION RATE: 18 BRPM | SYSTOLIC BLOOD PRESSURE: 148 MMHG | HEART RATE: 77 BPM

## 2025-04-22 PROCEDURE — 99214 OFFICE O/P EST MOD 30 MIN: CPT | Mod: 25

## 2025-04-22 PROCEDURE — 93000 ELECTROCARDIOGRAM COMPLETE: CPT

## 2025-04-22 RX ORDER — RANOLAZINE 500 MG/1
500 TABLET, EXTENDED RELEASE ORAL
Qty: 30 | Refills: 5 | Status: ACTIVE | COMMUNITY
Start: 2025-04-22 | End: 1900-01-01

## 2025-04-30 ENCOUNTER — NON-APPOINTMENT (OUTPATIENT)
Age: 70
End: 2025-04-30

## 2025-05-20 ENCOUNTER — APPOINTMENT (OUTPATIENT)
Dept: CARDIOLOGY | Facility: CLINIC | Age: 70
End: 2025-05-20

## 2025-05-20 ENCOUNTER — NON-APPOINTMENT (OUTPATIENT)
Age: 70
End: 2025-05-20

## 2025-05-20 ENCOUNTER — APPOINTMENT (OUTPATIENT)
Dept: CARDIOLOGY | Facility: CLINIC | Age: 70
End: 2025-05-20
Payer: MEDICARE

## 2025-05-20 VITALS
HEART RATE: 86 BPM | SYSTOLIC BLOOD PRESSURE: 120 MMHG | WEIGHT: 190 LBS | OXYGEN SATURATION: 99 % | DIASTOLIC BLOOD PRESSURE: 64 MMHG | RESPIRATION RATE: 18 BRPM | BODY MASS INDEX: 25.07 KG/M2

## 2025-05-20 DIAGNOSIS — R00.2 PALPITATIONS: ICD-10-CM

## 2025-05-20 DIAGNOSIS — I25.10 ATHEROSCLEROTIC HEART DISEASE OF NATIVE CORONARY ARTERY W/OUT ANGINA PECTORIS: ICD-10-CM

## 2025-05-20 DIAGNOSIS — R93.1 ABNORMAL FINDINGS ON DIAGNOSTIC IMAGING OF HEART AND CORONARY CIRCULATION: ICD-10-CM

## 2025-05-20 DIAGNOSIS — I10 ESSENTIAL (PRIMARY) HYPERTENSION: ICD-10-CM

## 2025-05-20 PROCEDURE — 99214 OFFICE O/P EST MOD 30 MIN: CPT

## 2025-05-20 PROCEDURE — 93306 TTE W/DOPPLER COMPLETE: CPT

## 2025-05-20 RX ORDER — DRONEDARONE 400 MG/1
400 TABLET, FILM COATED ORAL TWICE DAILY
Qty: 60 | Refills: 5 | Status: ACTIVE | COMMUNITY
Start: 2025-05-20 | End: 1900-01-01

## 2025-07-01 ENCOUNTER — APPOINTMENT (OUTPATIENT)
Dept: CARDIOLOGY | Facility: CLINIC | Age: 70
End: 2025-07-01
Payer: MEDICARE

## 2025-07-01 VITALS
BODY MASS INDEX: 23.75 KG/M2 | SYSTOLIC BLOOD PRESSURE: 127 MMHG | RESPIRATION RATE: 18 BRPM | WEIGHT: 180 LBS | HEART RATE: 84 BPM | DIASTOLIC BLOOD PRESSURE: 76 MMHG | OXYGEN SATURATION: 95 %

## 2025-07-01 PROCEDURE — 99214 OFFICE O/P EST MOD 30 MIN: CPT

## 2025-07-01 RX ORDER — BLOOD-GLUCOSE METER
KIT MISCELLANEOUS
Qty: 1 | Refills: 0 | Status: ACTIVE | COMMUNITY
Start: 2025-07-01 | End: 1900-01-01